# Patient Record
Sex: FEMALE | HISPANIC OR LATINO | URBAN - NONMETROPOLITAN AREA
[De-identification: names, ages, dates, MRNs, and addresses within clinical notes are randomized per-mention and may not be internally consistent; named-entity substitution may affect disease eponyms.]

---

## 2019-08-26 ENCOUNTER — OFFICE VISIT (OUTPATIENT)
Dept: URBAN - NONMETROPOLITAN AREA CLINIC 18 | Facility: CLINIC | Age: 59
End: 2019-08-26
Payer: MEDICAID

## 2019-08-26 DIAGNOSIS — H00.14 CHALAZION LEFT UPPER EYELID: ICD-10-CM

## 2019-08-26 DIAGNOSIS — H40.013 OPEN ANGLE WITH BORDERLINE FINDINGS, LOW RISK, BILATERAL: Primary | ICD-10-CM

## 2019-08-26 DIAGNOSIS — H25.13 AGE-RELATED NUCLEAR CATARACT, BILATERAL: ICD-10-CM

## 2019-08-26 DIAGNOSIS — H04.123 DRY EYE SYNDROME OF BILATERAL LACRIMAL GLANDS: ICD-10-CM

## 2019-08-26 DIAGNOSIS — H10.413 CONJUNCTIVITIS - ALLERGIC: ICD-10-CM

## 2019-08-26 PROCEDURE — 99214 OFFICE O/P EST MOD 30 MIN: CPT | Performed by: OPTOMETRIST

## 2019-08-26 PROCEDURE — 92133 CPTRZD OPH DX IMG PST SGM ON: CPT | Performed by: OPTOMETRIST

## 2019-08-26 RX ORDER — KETOTIFEN FUMARATE 0.35 MG/ML
SOLUTION/ DROPS OPHTHALMIC
Qty: 1 | Refills: 6 | Status: ACTIVE
Start: 2019-08-26

## 2019-08-26 ASSESSMENT — INTRAOCULAR PRESSURE
OD: 17
OS: 17

## 2019-08-26 NOTE — IMPRESSION/PLAN
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 Plan: Longstanding. Patient not interested in treatment or surgery at this time.

## 2019-08-26 NOTE — IMPRESSION/PLAN
Impression: Open angle with borderline findings, low risk, bilateral: H40.013. Plan: Ed pt on potential for glaucoma and risk of vision loss if left undetected. Following pt closely secondary  to H/O elevated IOP and family history (sister) of glaucoma. OCT ON today shows no thinning OU. Will monitor in 1 year with repeat OCT ON.

## 2019-08-26 NOTE — IMPRESSION/PLAN
Impression: Dry eye syndrome of bilateral lacrimal glands: H04.123. Plan: Discussed chronic nature of condition in detail with patient. Explained condition does not have a cure and will need artificial tears for maintenance. Recommended artificial tears QID OU and Genteal Gel QHS OU for continuous maintenance of tear film.

## 2023-08-09 ENCOUNTER — APPOINTMENT (RX ONLY)
Dept: URBAN - METROPOLITAN AREA CLINIC 153 | Facility: CLINIC | Age: 63
Setting detail: DERMATOLOGY
End: 2023-08-09

## 2023-08-09 DIAGNOSIS — L82.1 OTHER SEBORRHEIC KERATOSIS: ICD-10-CM

## 2023-08-09 DIAGNOSIS — B07.8 OTHER VIRAL WARTS: ICD-10-CM

## 2023-08-09 DIAGNOSIS — L663 OTHER SPECIFIED DISEASES OF HAIR AND HAIR FOLLICLES: ICD-10-CM

## 2023-08-09 DIAGNOSIS — L21.8 OTHER SEBORRHEIC DERMATITIS: ICD-10-CM

## 2023-08-09 DIAGNOSIS — L64.8 OTHER ANDROGENIC ALOPECIA: ICD-10-CM

## 2023-08-09 DIAGNOSIS — L73.9 FOLLICULAR DISORDER, UNSPECIFIED: ICD-10-CM

## 2023-08-09 DIAGNOSIS — L738 OTHER SPECIFIED DISEASES OF HAIR AND HAIR FOLLICLES: ICD-10-CM

## 2023-08-09 PROBLEM — L02.821 FURUNCLE OF HEAD [ANY PART, EXCEPT FACE]: Status: ACTIVE | Noted: 2023-08-09

## 2023-08-09 PROCEDURE — 99204 OFFICE O/P NEW MOD 45 MIN: CPT | Mod: 25

## 2023-08-09 PROCEDURE — ? COUNSELING

## 2023-08-09 PROCEDURE — ? CANTHARIDIN

## 2023-08-09 PROCEDURE — ? PRESCRIPTION

## 2023-08-09 PROCEDURE — 17110 DESTRUCTION B9 LES UP TO 14: CPT

## 2023-08-09 RX ORDER — DOXYCYCLINE HYCLATE 20 MG/1
TABLET, FILM COATED ORAL
Qty: 60 | Refills: 2 | Status: ERX | COMMUNITY
Start: 2023-08-09

## 2023-08-09 RX ORDER — MINOXIDIL 2.5 MG/1
TABLET ORAL
Qty: 30 | Refills: 2 | Status: ERX | COMMUNITY
Start: 2023-08-09

## 2023-08-09 RX ORDER — DUTASTERIDE 0.5 MG/1
CAPSULE, LIQUID FILLED ORAL
Qty: 30 | Refills: 2 | Status: ERX | COMMUNITY
Start: 2023-08-09

## 2023-08-09 RX ORDER — FLUOCINOLONE ACETONIDE 0.11 MG/ML
OIL TOPICAL
Qty: 118.28 | Refills: 2 | Status: ERX | COMMUNITY
Start: 2023-08-09

## 2023-08-09 RX ORDER — CICLOPIROX 10 MG/.96ML
SHAMPOO TOPICAL
Qty: 120 | Refills: 2 | Status: ERX | COMMUNITY
Start: 2023-08-09

## 2023-08-09 RX ADMIN — DOXYCYCLINE HYCLATE: 20 TABLET, FILM COATED ORAL at 00:00

## 2023-08-09 RX ADMIN — CICLOPIROX: 10 SHAMPOO TOPICAL at 00:00

## 2023-08-09 RX ADMIN — MINOXIDIL: 2.5 TABLET ORAL at 00:00

## 2023-08-09 RX ADMIN — FLUOCINOLONE ACETONIDE: 0.11 OIL TOPICAL at 00:00

## 2023-08-09 RX ADMIN — DUTASTERIDE: 0.5 CAPSULE, LIQUID FILLED ORAL at 00:00

## 2023-08-09 ASSESSMENT — LOCATION DETAILED DESCRIPTION DERM
LOCATION DETAILED: LEFT SUPERIOR PARIETAL SCALP
LOCATION DETAILED: RIGHT SUPERIOR MEDIAL UPPER BACK
LOCATION DETAILED: LEFT INDEX FINGERTIP
LOCATION DETAILED: LEFT MID ULNAR DORSAL RING FINGER
LOCATION DETAILED: RIGHT SUPERIOR PARIETAL SCALP
LOCATION DETAILED: LEFT MEDIAL FRONTAL SCALP

## 2023-08-09 ASSESSMENT — LOCATION ZONE DERM
LOCATION ZONE: TRUNK
LOCATION ZONE: FINGER
LOCATION ZONE: SCALP

## 2023-08-09 ASSESSMENT — LOCATION SIMPLE DESCRIPTION DERM
LOCATION SIMPLE: RIGHT UPPER BACK
LOCATION SIMPLE: LEFT SCALP
LOCATION SIMPLE: LEFT INDEX FINGER
LOCATION SIMPLE: LEFT RING FINGER
LOCATION SIMPLE: SCALP

## 2023-08-09 NOTE — PROCEDURE: CANTHARIDIN
Cantharone Plus Duration Text (Please Remove Duration From Postcare): The patient was instructed to leave the Cantharone Plus on for 6-8 hours and then wash the area well with soap and water.
Detail Level: Detailed
Consent: The patient's consent was obtained including but not limited to risks of crusting, scabbing, scarring, blistering, darker or lighter pigmentary change, recurrence, incomplete removal and infection.
Strength: Luan
Include Z78.9 (Other Specified Conditions Influencing Health Status) As An Associated Diagnosis?: No
Canthacur Duration Text (Please Remove Duration From Postcare): The patient was instructed to leave the Canthacur on for 6-8 hours and then wash the area well with soap and water.
Cantharone Forte Duration Text (Please Remove Duration From Postcare): The patient was instructed to leave the Cantharone Forte on for 6-8 hours and then wash the area well with soap and water.
Medical Necessity Clause: This procedure was medically necessary because the lesions that were treated were:
Post-Care Instructions: I reviewed with the patient in detail post-care instructions. The patient understands that the treated areas should be washed off 6 to 8 hours after application.
Medical Necessity Information: It is in your best interest to select a reason for this procedure from the list below. All of these items fulfill various CMS LCD requirements except the new and changing color options.
Curette Text: Prior to application of cantharidin the lesions were lightly pared with a curette.
Cantharone Duration Text (Please Remove Duration From Postcare): The patient was instructed to leave the Cantharone on for 6-8 hours and then wash the area well with soap and water.
Canthacur Ps Duration Text (Please Remove Duration From Postcare): The patient was instructed to leave the Canthacur PS on for 6-8 hours and then wash the area well with soap and water.

## 2023-11-09 ENCOUNTER — APPOINTMENT (RX ONLY)
Dept: URBAN - METROPOLITAN AREA CLINIC 153 | Facility: CLINIC | Age: 63
Setting detail: DERMATOLOGY
End: 2023-11-09

## 2023-11-09 DIAGNOSIS — L259 CONTACT DERMATITIS AND OTHER ECZEMA, UNSPECIFIED CAUSE: ICD-10-CM

## 2023-11-09 PROBLEM — L23.9 ALLERGIC CONTACT DERMATITIS, UNSPECIFIED CAUSE: Status: ACTIVE | Noted: 2023-11-09

## 2023-11-09 PROCEDURE — ? COUNSELING

## 2023-11-09 PROCEDURE — ? PRESCRIPTION

## 2023-11-09 PROCEDURE — 99213 OFFICE O/P EST LOW 20 MIN: CPT

## 2023-11-09 PROCEDURE — ? ADDITIONAL NOTES

## 2023-11-09 RX ORDER — FLUOCINONIDE 0.5 MG/ML
SOLUTION TOPICAL
Qty: 60 | Refills: 0 | Status: ERX | COMMUNITY
Start: 2023-11-09

## 2023-11-09 RX ADMIN — FLUOCINONIDE: 0.5 SOLUTION TOPICAL at 00:00

## 2023-11-09 ASSESSMENT — LOCATION ZONE DERM: LOCATION ZONE: SCALP

## 2023-11-09 ASSESSMENT — LOCATION SIMPLE DESCRIPTION DERM: LOCATION SIMPLE: POSTERIOR SCALP

## 2023-11-09 ASSESSMENT — LOCATION DETAILED DESCRIPTION DERM: LOCATION DETAILED: POSTERIOR MID-PARIETAL SCALP

## 2023-11-09 NOTE — PROCEDURE: ADDITIONAL NOTES
Detail Level: Simple
Render Risk Assessment In Note?: no
Additional Notes: Patient advised to get patch testing done in January.

## 2024-02-05 ENCOUNTER — APPOINTMENT (RX ONLY)
Dept: URBAN - METROPOLITAN AREA CLINIC 153 | Facility: CLINIC | Age: 64
Setting detail: DERMATOLOGY
End: 2024-02-05

## 2024-02-05 ENCOUNTER — RX ONLY (OUTPATIENT)
Age: 64
Setting detail: RX ONLY
End: 2024-02-05

## 2024-02-05 DIAGNOSIS — L64.8 OTHER ANDROGENIC ALOPECIA: ICD-10-CM

## 2024-02-05 DIAGNOSIS — L738 OTHER SPECIFIED DISEASES OF HAIR AND HAIR FOLLICLES: ICD-10-CM

## 2024-02-05 DIAGNOSIS — L73.9 FOLLICULAR DISORDER, UNSPECIFIED: ICD-10-CM

## 2024-02-05 DIAGNOSIS — L21.8 OTHER SEBORRHEIC DERMATITIS: ICD-10-CM

## 2024-02-05 DIAGNOSIS — L23.9 ALLERGIC CONTACT DERMATITIS, UNSPECIFIED CAUSE: ICD-10-CM

## 2024-02-05 DIAGNOSIS — L663 OTHER SPECIFIED DISEASES OF HAIR AND HAIR FOLLICLES: ICD-10-CM

## 2024-02-05 PROBLEM — L02.821 FURUNCLE OF HEAD [ANY PART, EXCEPT FACE]: Status: ACTIVE | Noted: 2024-02-05

## 2024-02-05 PROCEDURE — ? PRESCRIPTION

## 2024-02-05 PROCEDURE — 95044 PATCH/APPLICATION TESTS: CPT

## 2024-02-05 PROCEDURE — ? ADDITIONAL NOTES

## 2024-02-05 PROCEDURE — ? COUNSELING

## 2024-02-05 PROCEDURE — ? PATCH TESTING

## 2024-02-05 PROCEDURE — 99214 OFFICE O/P EST MOD 30 MIN: CPT | Mod: 25

## 2024-02-05 RX ORDER — CICLOPIROX 10 MG/.96ML
SHAMPOO TOPICAL
Qty: 120 | Refills: 9 | Status: ERX | COMMUNITY
Start: 2024-02-05

## 2024-02-05 RX ORDER — CICLOPIROX 10 MG/.96ML
SHAMPOO TOPICAL
Qty: 120 | Refills: 5 | Status: CANCELLED
Stop reason: CLARIF

## 2024-02-05 RX ORDER — DOXYCYCLINE HYCLATE 20 MG/1
TABLET, FILM COATED ORAL
Qty: 60 | Refills: 5 | Status: ERX

## 2024-02-05 RX ORDER — DUTASTERIDE 0.5 MG/1
CAPSULE, LIQUID FILLED ORAL
Qty: 30 | Refills: 5 | Status: ERX

## 2024-02-05 RX ORDER — FLUOCINOLONE ACETONIDE 0.11 MG/ML
OIL TOPICAL
Qty: 118.28 | Refills: 5 | Status: ERX

## 2024-02-05 RX ORDER — MINOXIDIL 2.5 MG/1
TABLET ORAL
Qty: 30 | Refills: 3 | Status: CANCELLED

## 2024-02-05 ASSESSMENT — LOCATION SIMPLE DESCRIPTION DERM
LOCATION SIMPLE: LEFT SCALP
LOCATION SIMPLE: SCALP

## 2024-02-05 ASSESSMENT — LOCATION DETAILED DESCRIPTION DERM
LOCATION DETAILED: LEFT SUPERIOR PARIETAL SCALP
LOCATION DETAILED: LEFT MEDIAL FRONTAL SCALP
LOCATION DETAILED: RIGHT SUPERIOR PARIETAL SCALP

## 2024-02-05 ASSESSMENT — LOCATION ZONE DERM: LOCATION ZONE: SCALP

## 2024-02-05 NOTE — PROCEDURE: PATCH TESTING
Number Of Patches (Maximum Allowable Per Dos By Cms Is 90): 80
Post-Care Instructions: I reviewed with the patient in detail post-care instructions. Patient should not sweat, pick at, or get the patches wet for 48 hours.
Consent: Verbal consent obtained, risks reviewed including but not limited to rash, itching, allergic reaction, systemic rash, remote possibility of anaphylaxis to allergen.
Detail Level: None

## 2024-02-05 NOTE — PROCEDURE: ADDITIONAL NOTES
Detail Level: Simple
Additional Notes: 36 and 55 were not tested on todays visit.
Render Risk Assessment In Note?: no

## 2024-02-07 ENCOUNTER — APPOINTMENT (RX ONLY)
Dept: URBAN - METROPOLITAN AREA CLINIC 153 | Facility: CLINIC | Age: 64
Setting detail: DERMATOLOGY
End: 2024-02-07

## 2024-02-07 DIAGNOSIS — L23.9 ALLERGIC CONTACT DERMATITIS, UNSPECIFIED CAUSE: ICD-10-CM

## 2024-02-07 PROCEDURE — ? NORTH AMERICAN 80 PATCH TEST READING

## 2024-02-07 RX ORDER — MINOXIDIL 2.5 MG/1
TABLET ORAL
Qty: 30 | Refills: 3 | Status: ERX

## 2024-02-07 NOTE — PROCEDURE: [place] 80 PATCH TEST READING
Name Of Allergen 37: 2-tert-Butyl-4-methoxyphenol (BHA)
Allergen 26 Reaction: no reaction
Name Of Allergen 8: Carba mix
Name Of Allergen 78: Methylisothiazolinone + Methylchloroisothiazolinone / (Cl+-Meisothiazolinone (Nelda CG 200ppm))
What Reading Time Point?: 48 hour
Name Of Allergen 27: Methyldibromo glutaronitrile (MDBGN)
Name Of Allergen 17: N,N-Diphenylguanidine
Name Of Allergen 47: Triethanolamine
Name Of Allergen 36: Ethyleneurea, melamine formaldehyde mix
Allergen 14 Reaction: 1+
Name Of Allergen 68: Fusidic acid sodium salt
Name Of Allergen 38: Gold(I)sodium thiosulfate dihydrate
Name Of Allergen 5: Imidazolidinyl urea Germall 115)
Name Of Allergen 54: Methylisothiazolinone
Name Of Allergen 2: 2-Mercaptobenzothiazole (MBT)
Name Of Allergen 48: Textile dye mix
Name Of Allergen 63: Iodopropynyl butyl carbamate
Name Of Allergen 52: Benzyl salicylate
Name Of Allergen 73: Ethylhexyl Salicylate
Name Of Allergen 71: Isoamyl-p-methoxycinnamate
Show Negative Results In The Note?: Yes
Name Of Allergen 62: Polysorbate 80 / (Polyoxyethylenesorbitanmonooleate (Tween 80))
Name Of Allergen 13: Epoxy resin Bisphenol A
Show Allergen Counseling In The Note?: No
Name Of Allergen 34: Benzophenone-3 / (2-Hydroxy-4-methoxybenzophenone)
Name Of Allergen 64: 2-n-Octyl-4-isothiazolin-3-one
Name Of Allergen 79: Propylene glycol
Name Of Allergen 9: Neomycin sulfate
Name Of Allergen 24: Mixed dialkyl thiourea
Name Of Allergen 26: Paraben Mix
Name Of Allergen 1: Benzocaine
Name Of Allergen 67: Lidocaine
Name Of Allergen 69: Dibucaine hydrochloride
Name Of Allergen 33: Propolis
Name Of Allergen 23: Bacitracin
Name Of Allergen 74: Hydroperoxides of Linalool
Name Of Allergen 25: Disperse Orange 3
Name Of Allergen 80: Oleamidopropyl Dimethylamine
Name Of Allergen 59: Isopropyl myristate
Name Of Allergen 61: Desoximetasone
Name Of Allergen 15: 4-vhgz-Dieeustflwguabubqiovthe resin
Name Of Allergen 6: Cinnamal / (Cinnamic aldehyde)
Name Of Allergen 3: Colophonium / (Colophony)
Name Of Allergen 76: Cocamidopropylbetaine
Name Of Allergen 35: Chloroxylenol (PCMX) / (4-Chloro-3,5-xylenol (PCMX))
Name Of Allergen 50: Fragrance Mix II
Name Of Allergen 4: P-Phenylenediamine (PPD
Name Of Allergen 32: Thimerosal (Merthiolate)
Name Of Allergen 39: Ethyl acrylate
Name Of Allergen 75: Amidoamine
Name Of Allergen 43: Cobalt(II) chloride hexahydrate
Name Of Allergen 51: Disperse Yellow 3
Name Of Allergen 16: Mercapto mix
Name Of Allergen 60: Hydroperoxides of Limonene
Name Of Allergen 65: Disperse Blue 106/124 Mix
Name Of Allergen 10: Thiuram mix
Name Of Allergen 12: Ethylenediamine dihydrochloride
Name Of Allergen 22: Tocopherol/ (DL alpha tocopherol)
Name Of Allergen 58: Benzyl alcohol
Name Of Allergen 7: Amerchol L 101
Name Of Allergen 44: Tixocortol-21-pivalate
Name Of Allergen 41: Toluenesulfonamide formaldehyde resin
Name Of Allergen 28: Fragrance mix
Name Of Allergen 53: Decyl Glucoside
Name Of Allergen 57: Cananga odorata oil / (Ylang-Ylang oil)
Number Of Patches Read: 80
Name Of Allergen 66: Compositae Mix II
Name Of Allergen 49: Tea Tree Oil
Name Of Allergen 20: Nickelsulfate hexahydrate
Name Of Allergen 56: DMDM Hydantoin
Detail Level: Zone
Name Of Allergen 42: Methyl methacrylate
Name Of Allergen 18: Potassium dichromate
Name Of Allergen 55: HEMA (2-Hydroxyethyl methacrylate)
Name Of Allergen 45: B-033A Budesonide
Name Of Allergen 14: Quaternium-15 (Dowicil 200) / (1-(3-Chloroallyl)-3,5,3-iqgfmg-2-azoniaadamantane chloride)
Name Of Allergen 40: Glyceryl monothioglycolate (GMTG)
Name Of Allergen 21: Diazolidinylurea (Germall II)
Name Of Allergen 11: Clobetasol-17-propionate
Allergen 28 Reaction: 2+
Name Of Allergen 19: Myroxylon pereirae resin / (Balsam [place])
Name Of Allergen 31: Sesquiterpene lactone mix
Name Of Allergen 77: Formaldehyde
Name Of Allergen 30: 2-Bromo-2-nitropropane-l,3-diol (Bronopol)
Name Of Allergen 72: Hydroxyisohexyl 3-CyclohexeneCarboxaldehyde (Lyral)
Name Of Allergen 46: Cocamide YAIMA / (Coconut diethanolamide)
Name Of Allergen 29: Glutaral / (Glutaraldehyde)
Name Of Allergen 70: Benzoyl Peroxide

## 2024-02-09 ENCOUNTER — APPOINTMENT (RX ONLY)
Dept: URBAN - METROPOLITAN AREA CLINIC 153 | Facility: CLINIC | Age: 64
Setting detail: DERMATOLOGY
End: 2024-02-09

## 2024-02-09 ENCOUNTER — RX ONLY (OUTPATIENT)
Age: 64
Setting detail: RX ONLY
End: 2024-02-09

## 2024-02-09 DIAGNOSIS — L23.9 ALLERGIC CONTACT DERMATITIS, UNSPECIFIED CAUSE: ICD-10-CM

## 2024-02-09 PROCEDURE — ? NORTH AMERICAN 80 PATCH TEST READING

## 2024-02-09 RX ORDER — MINOXIDIL 2.5 MG/1
TABLET ORAL
Qty: 30 | Refills: 11 | Status: ERX

## 2024-02-09 NOTE — PROCEDURE: [place] 80 PATCH TEST READING
Name Of Allergen 37: 2-tert-Butyl-4-methoxyphenol (BHA)
Allergen 26 Reaction: no reaction
Name Of Allergen 8: Carba mix
Name Of Allergen 78: Methylisothiazolinone + Methylchloroisothiazolinone / (Cl+-Meisothiazolinone (Nelda CG 200ppm))
What Reading Time Point?: 48 hour
Name Of Allergen 27: Methyldibromo glutaronitrile (MDBGN)
Name Of Allergen 17: N,N-Diphenylguanidine
Name Of Allergen 47: Triethanolamine
Name Of Allergen 36: Ethyleneurea, melamine formaldehyde mix
Allergen 14 Reaction: 1+
Name Of Allergen 68: Fusidic acid sodium salt
Name Of Allergen 38: Gold(I)sodium thiosulfate dihydrate
Name Of Allergen 5: Imidazolidinyl urea Germall 115)
Name Of Allergen 54: Methylisothiazolinone
Name Of Allergen 2: 2-Mercaptobenzothiazole (MBT)
Name Of Allergen 48: Textile dye mix
Name Of Allergen 63: Iodopropynyl butyl carbamate
Name Of Allergen 52: Benzyl salicylate
Name Of Allergen 73: Ethylhexyl Salicylate
Name Of Allergen 71: Isoamyl-p-methoxycinnamate
Show Negative Results In The Note?: Yes
Name Of Allergen 62: Polysorbate 80 / (Polyoxyethylenesorbitanmonooleate (Tween 80))
Name Of Allergen 13: Epoxy resin Bisphenol A
Show Allergen Counseling In The Note?: No
Name Of Allergen 34: Benzophenone-3 / (2-Hydroxy-4-methoxybenzophenone)
Name Of Allergen 64: 2-n-Octyl-4-isothiazolin-3-one
Name Of Allergen 79: Propylene glycol
Name Of Allergen 9: Neomycin sulfate
Name Of Allergen 24: Mixed dialkyl thiourea
Name Of Allergen 26: Paraben Mix
Name Of Allergen 1: Benzocaine
Name Of Allergen 67: Lidocaine
Name Of Allergen 69: Dibucaine hydrochloride
Name Of Allergen 33: Propolis
Name Of Allergen 23: Bacitracin
Name Of Allergen 74: Hydroperoxides of Linalool
Name Of Allergen 25: Disperse Orange 3
Name Of Allergen 80: Oleamidopropyl Dimethylamine
Name Of Allergen 59: Isopropyl myristate
Name Of Allergen 61: Desoximetasone
Name Of Allergen 15: 9-bjot-Dfqckczaothmawkshekigpw resin
Name Of Allergen 6: Cinnamal / (Cinnamic aldehyde)
Name Of Allergen 3: Colophonium / (Colophony)
Name Of Allergen 76: Cocamidopropylbetaine
Name Of Allergen 35: Chloroxylenol (PCMX) / (4-Chloro-3,5-xylenol (PCMX))
Name Of Allergen 50: Fragrance Mix II
Name Of Allergen 4: P-Phenylenediamine (PPD
Name Of Allergen 32: Thimerosal (Merthiolate)
Name Of Allergen 39: Ethyl acrylate
Name Of Allergen 75: Amidoamine
Name Of Allergen 43: Cobalt(II) chloride hexahydrate
Name Of Allergen 51: Disperse Yellow 3
Name Of Allergen 16: Mercapto mix
Name Of Allergen 60: Hydroperoxides of Limonene
Name Of Allergen 65: Disperse Blue 106/124 Mix
Name Of Allergen 10: Thiuram mix
Name Of Allergen 12: Ethylenediamine dihydrochloride
Name Of Allergen 22: Tocopherol/ (DL alpha tocopherol)
Name Of Allergen 58: Benzyl alcohol
Name Of Allergen 7: Amerchol L 101
Name Of Allergen 44: Tixocortol-21-pivalate
Name Of Allergen 41: Toluenesulfonamide formaldehyde resin
Name Of Allergen 28: Fragrance mix
Name Of Allergen 53: Decyl Glucoside
Name Of Allergen 57: Cananga odorata oil / (Ylang-Ylang oil)
Number Of Patches Read: 80
Name Of Allergen 66: Compositae Mix II
Name Of Allergen 49: Tea Tree Oil
Name Of Allergen 20: Nickelsulfate hexahydrate
Name Of Allergen 56: DMDM Hydantoin
Detail Level: Zone
Name Of Allergen 42: Methyl methacrylate
Name Of Allergen 18: Potassium dichromate
Name Of Allergen 55: HEMA (2-Hydroxyethyl methacrylate)
Name Of Allergen 45: B-033A Budesonide
Name Of Allergen 14: Quaternium-15 (Dowicil 200) / (1-(3-Chloroallyl)-3,5,1-gjtemc-2-azoniaadamantane chloride)
Name Of Allergen 40: Glyceryl monothioglycolate (GMTG)
Name Of Allergen 21: Diazolidinylurea (Germall II)
Name Of Allergen 11: Clobetasol-17-propionate
Allergen 28 Reaction: 2+
Name Of Allergen 19: Myroxylon pereirae resin / (Balsam [place])
Name Of Allergen 31: Sesquiterpene lactone mix
Name Of Allergen 77: Formaldehyde
Name Of Allergen 30: 2-Bromo-2-nitropropane-l,3-diol (Bronopol)
Name Of Allergen 72: Hydroxyisohexyl 3-CyclohexeneCarboxaldehyde (Lyral)
Name Of Allergen 46: Cocamide YAIMA / (Coconut diethanolamide)
Name Of Allergen 29: Glutaral / (Glutaraldehyde)
Name Of Allergen 70: Benzoyl Peroxide

## 2024-09-03 ENCOUNTER — RX ONLY (OUTPATIENT)
Age: 64
Setting detail: RX ONLY
End: 2024-09-03

## 2024-09-03 RX ORDER — DUTASTERIDE 0.5 MG/1
CAPSULE, LIQUID FILLED ORAL
Qty: 30 | Refills: 4 | Status: ERX

## 2024-09-04 ENCOUNTER — RX ONLY (OUTPATIENT)
Age: 64
Setting detail: RX ONLY
End: 2024-09-04

## 2024-09-04 RX ORDER — DUTASTERIDE 0.5 MG/1
CAPSULE, LIQUID FILLED ORAL
Qty: 90 | Refills: 2 | Status: ERX

## 2024-09-04 RX ORDER — DOXYCYCLINE HYCLATE 20 MG
TABLET ORAL
Qty: 60 | Refills: 5 | Status: ERX

## 2025-02-11 ENCOUNTER — APPOINTMENT (OUTPATIENT)
Dept: URBAN - METROPOLITAN AREA CLINIC 153 | Facility: CLINIC | Age: 65
Setting detail: DERMATOLOGY
End: 2025-02-11

## 2025-02-11 DIAGNOSIS — L82.1 OTHER SEBORRHEIC KERATOSIS: ICD-10-CM

## 2025-02-11 DIAGNOSIS — L64.8 OTHER ANDROGENIC ALOPECIA: ICD-10-CM

## 2025-02-11 DIAGNOSIS — L29.89 OTHER PRURITUS: ICD-10-CM

## 2025-02-11 PROCEDURE — 99213 OFFICE O/P EST LOW 20 MIN: CPT

## 2025-02-11 PROCEDURE — ? PRESCRIPTION

## 2025-02-11 PROCEDURE — ? INVENTORY

## 2025-02-11 PROCEDURE — ? COUNSELING

## 2025-02-11 PROCEDURE — ? BENIGN DESTRUCTION COSMETIC

## 2025-02-11 RX ORDER — MINOXIDIL 2.5 MG/1
TABLET ORAL
Qty: 45 | Refills: 4 | Status: ERX

## 2025-02-11 RX ORDER — DUTASTERIDE 0.5 MG/1
CAPSULE, LIQUID FILLED ORAL
Qty: 30 | Refills: 11 | Status: ERX

## 2025-02-11 RX ORDER — DOXEPIN HYDROCHLORIDE 10 MG/ML
SOLUTION ORAL
Qty: 120 | Refills: 11 | Status: ERX | COMMUNITY
Start: 2025-02-11

## 2025-02-11 RX ADMIN — DOXEPIN HYDROCHLORIDE: 10 SOLUTION ORAL at 00:00

## 2025-02-11 ASSESSMENT — LOCATION SIMPLE DESCRIPTION DERM
LOCATION SIMPLE: LEFT CHEEK
LOCATION SIMPLE: SCALP

## 2025-02-11 ASSESSMENT — LOCATION ZONE DERM
LOCATION ZONE: FACE
LOCATION ZONE: SCALP

## 2025-02-11 ASSESSMENT — LOCATION DETAILED DESCRIPTION DERM
LOCATION DETAILED: LEFT MEDIAL MALAR CHEEK
LOCATION DETAILED: RIGHT SUPERIOR PARIETAL SCALP
LOCATION DETAILED: LEFT CENTRAL MALAR CHEEK
LOCATION DETAILED: LEFT INFERIOR CENTRAL MALAR CHEEK
LOCATION DETAILED: LEFT INFERIOR MEDIAL MALAR CHEEK

## 2025-03-17 ENCOUNTER — RX ONLY (RX ONLY)
Age: 65
End: 2025-03-17

## 2025-03-17 RX ORDER — DOXYCYCLINE HYCLATE 20 MG
TABLET ORAL
Qty: 60 | Refills: 10 | Status: ERX

## 2025-04-09 ENCOUNTER — RX ONLY (RX ONLY)
Age: 65
End: 2025-04-09

## 2025-04-09 RX ORDER — CICLOPIROX 10 MG/.96ML
SHAMPOO TOPICAL
Qty: 120 | Refills: 0 | Status: ERX

## 2025-04-09 RX ORDER — CICLOPIROX 10 MG/.96ML
SHAMPOO TOPICAL
Qty: 120 | Refills: 9 | Status: ERX